# Patient Record
Sex: FEMALE | Race: WHITE | NOT HISPANIC OR LATINO
[De-identification: names, ages, dates, MRNs, and addresses within clinical notes are randomized per-mention and may not be internally consistent; named-entity substitution may affect disease eponyms.]

---

## 2021-09-08 PROBLEM — Z00.00 ENCOUNTER FOR PREVENTIVE HEALTH EXAMINATION: Status: ACTIVE | Noted: 2021-09-08

## 2022-02-02 PROBLEM — Z82.3 FAMILY HISTORY OF CEREBROVASCULAR ACCIDENT (CVA): Status: ACTIVE | Noted: 2022-02-02

## 2022-02-02 PROBLEM — Z82.49 FAMILY HISTORY OF CORONARY ARTERY DISEASE: Status: ACTIVE | Noted: 2022-02-02

## 2022-02-02 PROBLEM — Z83.438 FAMILY HISTORY OF HYPERLIPIDEMIA: Status: ACTIVE | Noted: 2022-02-02

## 2022-02-02 PROBLEM — U07.1 COVID-19: Status: RESOLVED | Noted: 2022-02-02 | Resolved: 2022-02-02

## 2022-02-04 ENCOUNTER — APPOINTMENT (OUTPATIENT)
Dept: CARDIOLOGY | Facility: CLINIC | Age: 42
End: 2022-02-04
Payer: COMMERCIAL

## 2022-02-04 VITALS
SYSTOLIC BLOOD PRESSURE: 128 MMHG | HEIGHT: 64 IN | WEIGHT: 115 LBS | DIASTOLIC BLOOD PRESSURE: 80 MMHG | BODY MASS INDEX: 19.63 KG/M2

## 2022-02-04 DIAGNOSIS — U07.1 COVID-19: ICD-10-CM

## 2022-02-04 DIAGNOSIS — Z82.3 FAMILY HISTORY OF STROKE: ICD-10-CM

## 2022-02-04 DIAGNOSIS — Z82.49 FAMILY HISTORY OF ISCHEMIC HEART DISEASE AND OTHER DISEASES OF THE CIRCULATORY SYSTEM: ICD-10-CM

## 2022-02-04 DIAGNOSIS — Z83.438 FAMILY HISTORY OF OTHER DISORDER OF LIPOPROTEIN METABOLISM AND OTHER LIPIDEMIA: ICD-10-CM

## 2022-02-04 PROCEDURE — 99214 OFFICE O/P EST MOD 30 MIN: CPT

## 2022-02-04 NOTE — REVIEW OF SYSTEMS
[Feeling Fatigued] : feeling fatigued [Fever] : no fever [Chills] : no chills [Blurry Vision] : no blurred vision [Earache] : no earache [Sore Throat] : no sore throat [Cough] : no cough [Abdominal Pain] : no abdominal pain [Diarrhea] : diarrhea [Constipation] : no constipation [Dysuria] : no dysuria [Joint Pain] : no joint pain [Myalgia] : no myalgia [Rash] : no rash [Skin Lesions] : no skin lesions [Dizziness] : no dizziness [Weakness] : no weakness [Confusion] : no confusion was observed [Swollen Glands] : no swollen glands

## 2022-02-04 NOTE — HISTORY OF PRESENT ILLNESS
[FreeTextEntry1] : Still has palpitations. But better. After fist vaccine palpitation worse. She had covid 3/2021.
4

## 2022-02-04 NOTE — DISCUSSION/SUMMARY
[FreeTextEntry1] : Pt had covid 3/21. Dempsey resolved  HR increased. Walked 2 min and  BPM in past . Now better. Sat was 100%. Use [employer] low impact She needs and echo May need stress test.  Bike 20-30 mins 3 per week. If palpitations worse consider [facility]\par \par Echo 5/21/21: Normal fx, Trace MR, Tr TI

## 2024-04-22 ENCOUNTER — APPOINTMENT (OUTPATIENT)
Dept: CARDIOLOGY | Facility: CLINIC | Age: 44
End: 2024-04-22
Payer: COMMERCIAL

## 2024-04-22 VITALS
WEIGHT: 112 LBS | SYSTOLIC BLOOD PRESSURE: 137 MMHG | DIASTOLIC BLOOD PRESSURE: 94 MMHG | OXYGEN SATURATION: 100 % | HEIGHT: 64 IN | BODY MASS INDEX: 19.12 KG/M2 | HEART RATE: 93 BPM

## 2024-04-22 DIAGNOSIS — R00.2 PALPITATIONS: ICD-10-CM

## 2024-04-22 DIAGNOSIS — R06.02 SHORTNESS OF BREATH: ICD-10-CM

## 2024-04-22 PROCEDURE — 99214 OFFICE O/P EST MOD 30 MIN: CPT

## 2024-04-22 NOTE — HISTORY OF PRESENT ILLNESS
[FreeTextEntry1] : Not here 2 years. Last week palpitations in car. Lasted 5 minutes. . Next day arellano steps. Nothing since. .

## 2024-04-22 NOTE — DISCUSSION/SUMMARY
[FreeTextEntry1] : Not here 2 years. Last week palpitations in car. Lasted 5 minutes. . Next day arellano steps. Nothing since. .She does [employer] 15 minutes 3 /week. EKG reviewed. Will do [facility]. She needs a echo.   Echo 5/21/21: Normal fx, Trace MR, Tr TI